# Patient Record
Sex: FEMALE | Race: BLACK OR AFRICAN AMERICAN | Employment: STUDENT | URBAN - METROPOLITAN AREA
[De-identification: names, ages, dates, MRNs, and addresses within clinical notes are randomized per-mention and may not be internally consistent; named-entity substitution may affect disease eponyms.]

---

## 2017-05-24 ENCOUNTER — OFFICE VISIT (OUTPATIENT)
Dept: FAMILY MEDICINE CLINIC | Facility: CLINIC | Age: 22
End: 2017-05-24

## 2017-05-24 ENCOUNTER — TELEPHONE (OUTPATIENT)
Dept: OBGYN CLINIC | Facility: CLINIC | Age: 22
End: 2017-05-24

## 2017-05-24 VITALS
SYSTOLIC BLOOD PRESSURE: 129 MMHG | DIASTOLIC BLOOD PRESSURE: 83 MMHG | RESPIRATION RATE: 16 BRPM | TEMPERATURE: 99 F | BODY MASS INDEX: 26 KG/M2 | HEART RATE: 101 BPM | WEIGHT: 153 LBS

## 2017-05-24 DIAGNOSIS — Z3A.30 30 WEEKS GESTATION OF PREGNANCY: Primary | ICD-10-CM

## 2017-05-24 PROCEDURE — 99212 OFFICE O/P EST SF 10 MIN: CPT | Performed by: FAMILY MEDICINE

## 2017-05-24 PROCEDURE — 99213 OFFICE O/P EST LOW 20 MIN: CPT | Performed by: FAMILY MEDICINE

## 2017-05-24 NOTE — TELEPHONE ENCOUNTER
PN Records from Dr. Gonzalez  office received via fax and placed on [facility] to start MD review process. Pt is 31w today.  THOMAS stated that she received a call from Dr. Schuyler Nicholson and Dr. Schuyler Nicholson would like to be notified if pt is accepted or denied into our prac

## 2017-05-24 NOTE — PROGRESS NOTES
HPI: Ms. Ti Hoff is a 22year old  female who is 32 0/7 weeks pregnant and presents for referral to OB/GYN. Was being seen by ARTUR SAAB CNM in [facility] for all her prenatal care up to this point. Last appt was 17.  She is up to date abdominal pain, constipation, decreased appetite, diarrhea and vomiting  Genitourinary:  Negative for dysuria and hematuria  Hema/Lymph:  Negative for easy bleeding and easy bruising  Integumentary:  Negative for pruritus and rash  Musculoskeletal:  Salvadore Stands

## 2017-05-25 ENCOUNTER — TELEPHONE (OUTPATIENT)
Dept: FAMILY MEDICINE CLINIC | Facility: CLINIC | Age: 22
End: 2017-05-25

## 2017-05-25 NOTE — TELEPHONE ENCOUNTER
Pt informed she has been accepted into our practice. Pt informed we have 6 docs (2 male, 4 female) and pt will see all of them throughout care. Pt verbalized understanding. Pt accepted OBN appt tomorrow at 10am. Records in old triage room.

## 2017-05-25 NOTE — TELEPHONE ENCOUNTER
LMTCB. MDs have reviewed pts PN records and pt has been accepted into our practice. Called Dr. Fabrice Rich office and informed one of her RNs that pt has been accepted. Pt needs to be scheduled for OBN appt.  Per Bashir Patel, pt can be added tomorrow (5/26) at 10am

## 2017-05-26 ENCOUNTER — NURSE ONLY (OUTPATIENT)
Dept: OBGYN CLINIC | Facility: CLINIC | Age: 22
End: 2017-05-26

## 2017-05-26 ENCOUNTER — APPOINTMENT (OUTPATIENT)
Dept: LAB | Facility: HOSPITAL | Age: 22
End: 2017-05-26
Attending: OBSTETRICS & GYNECOLOGY
Payer: MEDICAID

## 2017-05-26 VITALS — HEIGHT: 64 IN | WEIGHT: 154 LBS | BODY MASS INDEX: 26.29 KG/M2

## 2017-05-26 DIAGNOSIS — Z34.03 ENCOUNTER FOR SUPERVISION OF NORMAL FIRST PREGNANCY IN THIRD TRIMESTER: ICD-10-CM

## 2017-05-26 DIAGNOSIS — Z34.03 ENCOUNTER FOR SUPERVISION OF NORMAL FIRST PREGNANCY IN THIRD TRIMESTER: Primary | ICD-10-CM

## 2017-05-26 PROCEDURE — 36415 COLL VENOUS BLD VENIPUNCTURE: CPT

## 2017-05-26 PROCEDURE — 99211 OFF/OP EST MAY X REQ PHY/QHP: CPT | Performed by: OBSTETRICS & GYNECOLOGY

## 2017-05-26 PROCEDURE — 86787 VARICELLA-ZOSTER ANTIBODY: CPT

## 2017-05-26 NOTE — PROGRESS NOTES
Pt seen for OBN appt today with no complaints. Pt is a transfer of care from a clinic is [place], [place]. All PN labs were completed at previous clinic with the exception of Varicella. Pt denies ever having chicken pox and unsure if vaccinated.  Varicella or Thalassemia No    Other inherited genetic or chromosomal disorders No    Patient or baby's father had a child with birth defects not listed above No    Previous miscarriages or stillborn No

## 2017-05-30 ENCOUNTER — APPOINTMENT (OUTPATIENT)
Dept: PERINATAL CARE | Facility: HOSPITAL | Age: 22
DRG: 781 | End: 2017-05-30
Attending: OBSTETRICS & GYNECOLOGY
Payer: MEDICAID

## 2017-05-30 ENCOUNTER — INITIAL PRENATAL (OUTPATIENT)
Dept: OBGYN CLINIC | Facility: CLINIC | Age: 22
End: 2017-05-30

## 2017-05-30 ENCOUNTER — HOSPITAL ENCOUNTER (INPATIENT)
Facility: HOSPITAL | Age: 22
LOS: 2 days | Discharge: HOME | DRG: 781 | End: 2017-06-01
Attending: OBSTETRICS & GYNECOLOGY | Admitting: OBSTETRICS & GYNECOLOGY
Payer: MEDICAID

## 2017-05-30 VITALS
WEIGHT: 155 LBS | HEART RATE: 78 BPM | SYSTOLIC BLOOD PRESSURE: 161 MMHG | BODY MASS INDEX: 27 KG/M2 | DIASTOLIC BLOOD PRESSURE: 94 MMHG

## 2017-05-30 DIAGNOSIS — O13.3 PREGNANCY INDUCED HYPERTENSION, THIRD TRIMESTER: ICD-10-CM

## 2017-05-30 DIAGNOSIS — O36.5930 SGA (SMALL FOR GESTATIONAL AGE), FETAL, AFFECTING CARE OF MOTHER, ANTEPARTUM, THIRD TRIMESTER, NOT APPLICABLE OR UNSPECIFIED FETUS: Primary | ICD-10-CM

## 2017-05-30 DIAGNOSIS — O36.5930 SMALL FOR DATES AFFECTING MANAGEMENT OF MOTHER, THIRD TRIMESTER, NOT APPLICABLE OR UNSPECIFIED FETUS: ICD-10-CM

## 2017-05-30 DIAGNOSIS — R31.9 HEMATURIA: ICD-10-CM

## 2017-05-30 DIAGNOSIS — O13.3 PIH (PREGNANCY INDUCED HYPERTENSION), THIRD TRIMESTER: ICD-10-CM

## 2017-05-30 DIAGNOSIS — Z34.03 ENCOUNTER FOR SUPERVISION OF NORMAL FIRST PREGNANCY IN THIRD TRIMESTER: Primary | ICD-10-CM

## 2017-05-30 PROBLEM — O16.3 ELEVATED BLOOD PRESSURE AFFECTING PREGNANCY IN THIRD TRIMESTER, ANTEPARTUM: Status: ACTIVE | Noted: 2017-05-30

## 2017-05-30 PROBLEM — O13.9 PREGNANCY INDUCED HYPERTENSION: Status: ACTIVE | Noted: 2017-05-30

## 2017-05-30 PROBLEM — O36.5990 SGA (SMALL FOR GESTATIONAL AGE), FETAL, AFFECTING CARE OF MOTHER, ANTEPARTUM: Status: ACTIVE | Noted: 2017-05-30

## 2017-05-30 PROCEDURE — 81002 URINALYSIS NONAUTO W/O SCOPE: CPT | Performed by: OBSTETRICS & GYNECOLOGY

## 2017-05-30 PROCEDURE — 76811 OB US DETAILED SNGL FETUS: CPT | Performed by: OBSTETRICS & GYNECOLOGY

## 2017-05-30 PROCEDURE — 0500F INITIAL PRENATAL CARE VISIT: CPT | Performed by: OBSTETRICS & GYNECOLOGY

## 2017-05-30 PROCEDURE — 99222 1ST HOSP IP/OBS MODERATE 55: CPT | Performed by: OBSTETRICS & GYNECOLOGY

## 2017-05-30 RX ORDER — SODIUM CHLORIDE, SODIUM LACTATE, POTASSIUM CHLORIDE, CALCIUM CHLORIDE 600; 310; 30; 20 MG/100ML; MG/100ML; MG/100ML; MG/100ML
INJECTION, SOLUTION INTRAVENOUS CONTINUOUS
Status: DISCONTINUED | OUTPATIENT
Start: 2017-05-30 | End: 2017-06-01

## 2017-05-30 RX ORDER — BETAMETHASONE SODIUM PHOSPHATE AND BETAMETHASONE ACETATE 3; 3 MG/ML; MG/ML
12 INJECTION, SUSPENSION INTRA-ARTICULAR; INTRALESIONAL; INTRAMUSCULAR; SOFT TISSUE EVERY 24 HOURS
Status: COMPLETED | OUTPATIENT
Start: 2017-05-30 | End: 2017-05-31

## 2017-05-30 RX ORDER — LABETALOL HYDROCHLORIDE 5 MG/ML
INJECTION, SOLUTION INTRAVENOUS
Status: DISPENSED
Start: 2017-05-30 | End: 2017-05-31

## 2017-05-30 RX ORDER — LABETALOL HYDROCHLORIDE 5 MG/ML
20 INJECTION, SOLUTION INTRAVENOUS
Status: DISCONTINUED | OUTPATIENT
Start: 2017-05-30 | End: 2017-06-01

## 2017-05-30 RX ORDER — SODIUM CHLORIDE 0.9 % (FLUSH) 0.9 %
10 SYRINGE (ML) INJECTION AS NEEDED
Status: DISCONTINUED | OUTPATIENT
Start: 2017-05-30 | End: 2017-06-01

## 2017-05-30 RX ORDER — CALCIUM GLUCONATE 94 MG/ML
1 INJECTION, SOLUTION INTRAVENOUS ONCE AS NEEDED
Status: DISPENSED | OUTPATIENT
Start: 2017-05-30 | End: 2017-05-30

## 2017-05-30 RX ORDER — DEXTROSE, SODIUM CHLORIDE, SODIUM LACTATE, POTASSIUM CHLORIDE, AND CALCIUM CHLORIDE 5; .6; .31; .03; .02 G/100ML; G/100ML; G/100ML; G/100ML; G/100ML
INJECTION, SOLUTION INTRAVENOUS
Status: COMPLETED
Start: 2017-05-30 | End: 2017-05-30

## 2017-05-30 NOTE — CONSULTS
[address] [address] Patient Status:  Observation    1995 MRN [id number]   Location [address] C-D Attending David Badillo DO   Hosp Day # 0 PCP Kwesi Brown MD     SUBJECTIVE:  Reason for complete ultrasound report or in PACS    Data Review:     Lab Results  Component Value Date   WBC 9.6 05/30/2017   HGB 13.1 05/30/2017   HCT 39.5 05/30/2017    05/30/2017   CREATSERUM 0.81 05/30/2017   BUN 9 05/30/2017    05/30/2017   K 4.1 05

## 2017-05-30 NOTE — PROGRESS NOTES
New Transfer of care -- seen at [facility] for vaginal spotting last week & still w/ bleeding when urinates -- had high BP then & told to just f/u w/ PCP OB. Now w/ lower abd pain / back pain. No recent coitus.  SSE no blood -- FFN obtained to take to L&D  (+) H

## 2017-05-30 NOTE — H&P
[address] [facility] Patient Status:  Inpatient    1995 MRN [id number]   Location [address] [facility] [address] C-D Attending Luke Pascal DO   Hosp Day # 0 PCP Juarez Lobato MD     Date of Admission hospital encounter of 91/57/39  -COMP METABOLIC PANEL (14)   Result Value Ref Range   Glucose 81 70-99 mg/dL   Sodium 139 136-144 mmol/L   Potassium 4.1 3.3-5.1 mmol/L   Chloride 103  mmol/L   CO2 24 22-32 mmol/L   BUN 9 8-20 mg/dL   Creatinine 0.81 % 20 %   Monocyte % 11 %   Eosinophil % 3 %   Basophil % 1 %   Neutrophil Absolute 6.3 1.8-7.7 K/UL   Lymphocyte Absolute 1.9 1.0-4.0 K/UL   Monocyte Absolute 1.0 0.0-1.0 K/UL   Eosinophil Absolute 0.3 0.0-0.7 K/UL   Basophil Absolute 0.1 0.0-0.2 K/UL

## 2017-05-31 ENCOUNTER — APPOINTMENT (OUTPATIENT)
Dept: PERINATAL CARE | Facility: HOSPITAL | Age: 22
DRG: 781 | End: 2017-05-31
Attending: OBSTETRICS & GYNECOLOGY
Payer: MEDICAID

## 2017-05-31 PROCEDURE — 76819 FETAL BIOPHYS PROFIL W/O NST: CPT | Performed by: OBSTETRICS & GYNECOLOGY

## 2017-05-31 NOTE — PROGRESS NOTES
This note also relates to the following rows which could not be included:  Pulse - Cannot attach notes to unvalidated device data  BP - Cannot attach notes to unvalidated device data  SpO2 - Cannot attach notes to unvalidated device data  Pt c/o leaking of

## 2017-05-31 NOTE — CONSULTS
[facility]  Maternal Fetal Medicine Progress Note    Elizabeth Gee Patient Status:  Inpatient    1995 MRN [id number]   Location [address] C-D Attending Gloria Escobar, [facility] Day # 1 PCP Nhea Amador MD     [address] [facility] maternal risks of hypertension in pregnancy, and compared and contrasted gestational hypertension and preeclampsia.   We discussed that severe features (blood pressure and IUGR) can occur without significant proteinuria and is commonly called atypical preec

## 2017-05-31 NOTE — PROGRESS NOTES
This note also relates to the following rows which could not be included:  Pulse - Cannot attach notes to unvalidated device data  BP - Cannot attach notes to unvalidated device data  SpO2 - Cannot attach notes to unvalidated device data  DR Yeni Perez WAS

## 2017-05-31 NOTE — PROGRESS NOTES
[facility]    Antepartum Progress Note    Jean Claude Perez Patient Status:  Inpatient    1995 MRN [id number]   Location [address] C-D Attending David Badillo, [address] [facility] Day # 1 PCP Kwesi Brown MD     Date of Adm performed during the hospital encounter of 81/82/29  -COMP METABOLIC PANEL (14)   Result Value Ref Range   Glucose 81 70-99 mg/dL   Sodium 139 136-144 mmol/L   Potassium 4.1 3.3-5.1 mmol/L   Chloride 103  mmol/L   CO2 24 22-32 mmol/L   BUN 9 8-20 mg/ 70-99 mg/dL   Sodium 138 136-144 mmol/L   Potassium 4.1 3.3-5.1 mmol/L   Chloride 108  mmol/L   CO2 20 (L) 22-32 mmol/L   BUN 5 (L) 8-20 mg/dL   Creatinine 0.78 0.50-1.50 mg/dL   Calcium, Total 7.9 (L) 8.5-10.5 mg/dL   ALT 10 (L) 14-54 U/L   AST 19 1 elevated dopplers      Plan reviewed with Dr. Azra Carlisle ([facility] on call)   Fetus had 8/8 BPP with elevated dopplers today (dopplers slightly better, however still elevated)  Dr. Little Amen stopping mag and monitoring si/sx of preE  BP stable overnight on mag--rep

## 2017-06-01 VITALS
RESPIRATION RATE: 16 BRPM | WEIGHT: 155 LBS | OXYGEN SATURATION: 100 % | HEIGHT: 64 IN | TEMPERATURE: 98 F | BODY MASS INDEX: 26.46 KG/M2 | HEART RATE: 71 BPM | SYSTOLIC BLOOD PRESSURE: 130 MMHG | DIASTOLIC BLOOD PRESSURE: 82 MMHG

## 2017-06-01 PROCEDURE — 99232 SBSQ HOSP IP/OBS MODERATE 35: CPT | Performed by: OBSTETRICS & GYNECOLOGY

## 2017-06-01 PROCEDURE — 59025 FETAL NON-STRESS TEST: CPT | Performed by: OBSTETRICS & GYNECOLOGY

## 2017-06-01 NOTE — CONSULTS
[facility]  Maternal Fetal Medicine Progress Note    Darryn Prim Patient Status:  Inpatient    1995 MRN [id number]   Location [address] C-D Attending Zoya Mayorga, [address] [facility] Day # 2 PCP Shawna Askew MD     [address] [facility] or severe criteria of preeclampsia and that she needs to call her physician of she has severe HTN or symptoms (abdominal pain, HA, visual changes, N/V or concerns about fetal movment).     IMPRESSION:  ·  IUP at 33w4d  · Gestational hypertension   · Fetal g

## 2017-06-01 NOTE — PROGRESS NOTES
[facility]    OB/GYNE Antepartum note      Darryn Prim Patient Status:  Inpatient    1995 MRN [id number]   Location [address] C-D Attending Zoya Mayorga, [address] [facility] Day # 2 PCP MD Smita Torrez

## 2017-06-01 NOTE — PROGRESS NOTES
Discharge instructions gone over with patient and given to pt. Follow up for Bio-physical, ultrasound, NST's and doctor visit scheduled and reviewed with pt. Pt denies questions at this time.  Pt discharged home per wheelchair with S.O.

## 2017-06-06 ENCOUNTER — HOSPITAL ENCOUNTER (OUTPATIENT)
Dept: PERINATAL CARE | Facility: HOSPITAL | Age: 22
Discharge: HOME | End: 2017-06-06
Attending: OBSTETRICS & GYNECOLOGY
Payer: MEDICAID

## 2017-06-06 ENCOUNTER — HOSPITAL ENCOUNTER (INPATIENT)
Facility: HOSPITAL | Age: 22
LOS: 3 days | Discharge: HOME | End: 2017-06-09
Attending: OBSTETRICS & GYNECOLOGY | Admitting: OBSTETRICS & GYNECOLOGY
Payer: MEDICAID

## 2017-06-06 ENCOUNTER — APPOINTMENT (OUTPATIENT)
Dept: OBGYN CLINIC | Facility: HOSPITAL | Age: 22
End: 2017-06-06
Payer: MEDICAID

## 2017-06-06 ENCOUNTER — ROUTINE PRENATAL (OUTPATIENT)
Dept: OBGYN CLINIC | Facility: CLINIC | Age: 22
End: 2017-06-06

## 2017-06-06 VITALS
SYSTOLIC BLOOD PRESSURE: 134 MMHG | BODY MASS INDEX: 26.38 KG/M2 | DIASTOLIC BLOOD PRESSURE: 97 MMHG | HEART RATE: 97 BPM | WEIGHT: 154.5 LBS | RESPIRATION RATE: 16 BRPM | HEIGHT: 64 IN

## 2017-06-06 VITALS
HEART RATE: 114 BPM | DIASTOLIC BLOOD PRESSURE: 92 MMHG | SYSTOLIC BLOOD PRESSURE: 137 MMHG | BODY MASS INDEX: 26 KG/M2 | WEIGHT: 153 LBS

## 2017-06-06 DIAGNOSIS — O36.5930 SGA (SMALL FOR GESTATIONAL AGE), FETAL, AFFECTING CARE OF MOTHER, ANTEPARTUM, THIRD TRIMESTER, NOT APPLICABLE OR UNSPECIFIED FETUS: ICD-10-CM

## 2017-06-06 DIAGNOSIS — O36.5930 IUGR (INTRAUTERINE GROWTH RESTRICTION) AFFECTING CARE OF MOTHER, THIRD TRIMESTER, NOT APPLICABLE OR UNSPECIFIED FETUS: ICD-10-CM

## 2017-06-06 DIAGNOSIS — O13.3 GESTATIONAL HYPERTENSION W/O SIGNIFICANT PROTEINURIA IN 3RD TRIMESTER: ICD-10-CM

## 2017-06-06 DIAGNOSIS — Z34.93 ENCOUNTER FOR SUPERVISION OF NORMAL PREGNANCY IN THIRD TRIMESTER, UNSPECIFIED GRAVIDITY: Primary | ICD-10-CM

## 2017-06-06 DIAGNOSIS — O16.3 ELEVATED BLOOD PRESSURE AFFECTING PREGNANCY IN THIRD TRIMESTER, ANTEPARTUM: ICD-10-CM

## 2017-06-06 DIAGNOSIS — O16.3 ELEVATED BLOOD PRESSURE AFFECTING PREGNANCY IN THIRD TRIMESTER, ANTEPARTUM: Primary | ICD-10-CM

## 2017-06-06 PROBLEM — O36.5990 IUGR (INTRAUTERINE GROWTH RESTRICTION) AFFECTING CARE OF MOTHER: Status: ACTIVE | Noted: 2017-06-06

## 2017-06-06 PROCEDURE — 76819 FETAL BIOPHYS PROFIL W/O NST: CPT | Performed by: OBSTETRICS & GYNECOLOGY

## 2017-06-06 PROCEDURE — 99215 OFFICE O/P EST HI 40 MIN: CPT | Performed by: OBSTETRICS & GYNECOLOGY

## 2017-06-06 PROCEDURE — 76820 UMBILICAL ARTERY ECHO: CPT | Performed by: OBSTETRICS & GYNECOLOGY

## 2017-06-06 PROCEDURE — 76821 MIDDLE CEREBRAL ARTERY ECHO: CPT | Performed by: OBSTETRICS & GYNECOLOGY

## 2017-06-06 PROCEDURE — 76815 OB US LIMITED FETUS(S): CPT | Performed by: OBSTETRICS & GYNECOLOGY

## 2017-06-06 PROCEDURE — 0502F SUBSEQUENT PRENATAL CARE: CPT | Performed by: OBSTETRICS & GYNECOLOGY

## 2017-06-06 RX ORDER — AMMONIA INHALANTS 0.04 G/.3ML
0.3 INHALANT RESPIRATORY (INHALATION) AS NEEDED
Status: DISCONTINUED | OUTPATIENT
Start: 2017-06-06 | End: 2017-06-07

## 2017-06-06 RX ORDER — DEXTROSE, SODIUM CHLORIDE, SODIUM LACTATE, POTASSIUM CHLORIDE, AND CALCIUM CHLORIDE 5; .6; .31; .03; .02 G/100ML; G/100ML; G/100ML; G/100ML; G/100ML
125 INJECTION, SOLUTION INTRAVENOUS CONTINUOUS
Status: DISCONTINUED | OUTPATIENT
Start: 2017-06-06 | End: 2017-06-07

## 2017-06-06 RX ORDER — CALCIUM GLUCONATE 94 MG/ML
1 INJECTION, SOLUTION INTRAVENOUS ONCE AS NEEDED
Status: ACTIVE | OUTPATIENT
Start: 2017-06-06 | End: 2017-06-06

## 2017-06-06 RX ORDER — LABETALOL HYDROCHLORIDE 5 MG/ML
20 INJECTION, SOLUTION INTRAVENOUS
Status: DISCONTINUED | OUTPATIENT
Start: 2017-06-06 | End: 2017-06-09

## 2017-06-06 RX ORDER — LABETALOL HYDROCHLORIDE 5 MG/ML
INJECTION, SOLUTION INTRAVENOUS
Status: DISPENSED
Start: 2017-06-06 | End: 2017-06-07

## 2017-06-06 RX ORDER — SODIUM CHLORIDE, SODIUM LACTATE, POTASSIUM CHLORIDE, CALCIUM CHLORIDE 600; 310; 30; 20 MG/100ML; MG/100ML; MG/100ML; MG/100ML
INJECTION, SOLUTION INTRAVENOUS ONCE
Status: COMPLETED | OUTPATIENT
Start: 2017-06-06 | End: 2017-06-07

## 2017-06-06 RX ORDER — HYDRALAZINE HYDROCHLORIDE 20 MG/ML
INJECTION INTRAMUSCULAR; INTRAVENOUS
Status: DISCONTINUED | OUTPATIENT
Start: 2017-06-06 | End: 2017-06-09

## 2017-06-06 RX ORDER — SODIUM CHLORIDE 0.9 % (FLUSH) 0.9 %
10 SYRINGE (ML) INJECTION AS NEEDED
Status: DISCONTINUED | OUTPATIENT
Start: 2017-06-06 | End: 2017-06-07

## 2017-06-06 RX ORDER — IBUPROFEN 600 MG/1
600 TABLET ORAL ONCE AS NEEDED
Status: DISCONTINUED | OUTPATIENT
Start: 2017-06-06 | End: 2017-06-07

## 2017-06-06 RX ORDER — TERBUTALINE SULFATE 1 MG/ML
0.25 INJECTION, SOLUTION SUBCUTANEOUS AS NEEDED
Status: DISCONTINUED | OUTPATIENT
Start: 2017-06-06 | End: 2017-06-07

## 2017-06-06 RX ORDER — LIDOCAINE HYDROCHLORIDE 10 MG/ML
30 INJECTION, SOLUTION EPIDURAL; INFILTRATION; INTRACAUDAL; PERINEURAL ONCE
Status: DISCONTINUED | OUTPATIENT
Start: 2017-06-06 | End: 2017-06-07

## 2017-06-06 NOTE — PROGRESS NOTES
MFM follow up outpatient consultation -   IUGR; gestational hypertension    S: Good FM, no swelling, no visual changes, no HA, no N/V    Her history was reviewed form her visit on 5/30/17 and there were no interval changes.     O:  /97 mmHg  Pulse 97 me if additional questions or concerns arise. Total patient time was  40 minutes in evaluation, consultation, and coordination of care. Greater than 50% of this time was spent in face to face discussion with the patient.    I discussed the patient with

## 2017-06-06 NOTE — ADDENDUM NOTE
Encounter addended by: Marisa Mccarthy on: 6/6/2017  3:15 PM<BR>     Documentation filed: Charges VN

## 2017-06-06 NOTE — PROGRESS NOTES
Pt resting quietly with no complaints of pain or PIH symptoms, leung to gravity, Magnesium sulfate at 2gms/hr, D5LR infusing on pump at 75 cc/HR for total fluid volume of 125cc.hr.  Pt denies any contractions at this time

## 2017-06-06 NOTE — PROGRESS NOTES
No issues reported. To DMG for NST and BPP/Dopplers for IUGR, PIH. BP log reviewed and 100-140s/70-90s. No PreE symptoms reported. RTC 1 wk.

## 2017-06-06 NOTE — PROGRESS NOTES
Received pt from [facility] triage. Pt is to be admitted for IUGR, and IOL to start tonight at 1800. Pt oriented to room surroundings, plan of care discussed with patient and family. Pt denies any visual disturbances, headaches or epigastric pain.  Pt denies any u

## 2017-06-07 ENCOUNTER — ANESTHESIA EVENT (OUTPATIENT)
Dept: OBGYN UNIT | Facility: HOSPITAL | Age: 22
End: 2017-06-07
Payer: MEDICAID

## 2017-06-07 ENCOUNTER — ANESTHESIA (OUTPATIENT)
Dept: OBGYN UNIT | Facility: HOSPITAL | Age: 22
End: 2017-06-07
Payer: MEDICAID

## 2017-06-07 PROCEDURE — 99232 SBSQ HOSP IP/OBS MODERATE 35: CPT | Performed by: OBSTETRICS & GYNECOLOGY

## 2017-06-07 PROCEDURE — 3E033VJ INTRODUCTION OF OTHER HORMONE INTO PERIPHERAL VEIN, PERCUTANEOUS APPROACH: ICD-10-PCS | Performed by: OBSTETRICS & GYNECOLOGY

## 2017-06-07 PROCEDURE — 0T9B70Z DRAINAGE OF BLADDER WITH DRAINAGE DEVICE, VIA NATURAL OR ARTIFICIAL OPENING: ICD-10-PCS | Performed by: OBSTETRICS & GYNECOLOGY

## 2017-06-07 PROCEDURE — 3E0E7GC INTRODUCTION OF OTHER THERAPEUTIC SUBSTANCE INTO PRODUCTS OF CONCEPTION, VIA NATURAL OR ARTIFICIAL OPENING: ICD-10-PCS | Performed by: OBSTETRICS & GYNECOLOGY

## 2017-06-07 PROCEDURE — 10H07YZ INSERTION OF OTHER DEVICE INTO PRODUCTS OF CONCEPTION, VIA NATURAL OR ARTIFICIAL OPENING: ICD-10-PCS | Performed by: OBSTETRICS & GYNECOLOGY

## 2017-06-07 RX ORDER — LIDOCAINE HYDROCHLORIDE 10 MG/ML
INJECTION, SOLUTION EPIDURAL; INFILTRATION; INTRACAUDAL; PERINEURAL AS NEEDED
Status: DISCONTINUED | OUTPATIENT
Start: 2017-06-07 | End: 2017-06-07 | Stop reason: SURG

## 2017-06-07 RX ORDER — HYDROCODONE BITARTRATE AND ACETAMINOPHEN 5; 325 MG/1; MG/1
1 TABLET ORAL EVERY 4 HOURS PRN
Status: DISCONTINUED | OUTPATIENT
Start: 2017-06-07 | End: 2017-06-09

## 2017-06-07 RX ORDER — DIAPER,BRIEF,INFANT-TODD,DISP
1 EACH MISCELLANEOUS EVERY 6 HOURS PRN
Status: DISCONTINUED | OUTPATIENT
Start: 2017-06-07 | End: 2017-06-09

## 2017-06-07 RX ORDER — DEXTROSE, SODIUM CHLORIDE, SODIUM LACTATE, POTASSIUM CHLORIDE, AND CALCIUM CHLORIDE 5; .6; .31; .03; .02 G/100ML; G/100ML; G/100ML; G/100ML; G/100ML
INJECTION, SOLUTION INTRAVENOUS
Status: COMPLETED
Start: 2017-06-07 | End: 2017-06-07

## 2017-06-07 RX ORDER — AMMONIA INHALANTS 0.04 G/.3ML
0.3 INHALANT RESPIRATORY (INHALATION) AS NEEDED
Status: DISCONTINUED | OUTPATIENT
Start: 2017-06-07 | End: 2017-06-09

## 2017-06-07 RX ORDER — SODIUM CHLORIDE, SODIUM LACTATE, POTASSIUM CHLORIDE, CALCIUM CHLORIDE 600; 310; 30; 20 MG/100ML; MG/100ML; MG/100ML; MG/100ML
INJECTION, SOLUTION INTRAVENOUS
Status: DISPENSED
Start: 2017-06-07 | End: 2017-06-08

## 2017-06-07 RX ORDER — ONDANSETRON 2 MG/ML
4 INJECTION INTRAMUSCULAR; INTRAVENOUS EVERY 6 HOURS PRN
Status: DISCONTINUED | OUTPATIENT
Start: 2017-06-07 | End: 2017-06-09

## 2017-06-07 RX ORDER — NALBUPHINE HCL 10 MG/ML
2.5 AMPUL (ML) INJECTION
Status: DISCONTINUED | OUTPATIENT
Start: 2017-06-07 | End: 2017-06-07

## 2017-06-07 RX ORDER — EPHEDRINE SULFATE/0.9% NACL/PF 25 MG/5 ML
SYRINGE (ML) INTRAVENOUS
Status: DISCONTINUED
Start: 2017-06-07 | End: 2017-06-07 | Stop reason: WASHOUT

## 2017-06-07 RX ORDER — BISACODYL 10 MG
10 SUPPOSITORY, RECTAL RECTAL ONCE AS NEEDED
Status: DISCONTINUED | OUTPATIENT
Start: 2017-06-07 | End: 2017-06-09

## 2017-06-07 RX ORDER — ACETAMINOPHEN 325 MG/1
650 TABLET ORAL EVERY 4 HOURS PRN
Status: DISCONTINUED | OUTPATIENT
Start: 2017-06-07 | End: 2017-06-09

## 2017-06-07 RX ORDER — LIDOCAINE HYDROCHLORIDE AND EPINEPHRINE 15; 5 MG/ML; UG/ML
INJECTION, SOLUTION EPIDURAL AS NEEDED
Status: DISCONTINUED | OUTPATIENT
Start: 2017-06-07 | End: 2017-06-07 | Stop reason: SURG

## 2017-06-07 RX ORDER — SODIUM CHLORIDE 0.9 % (FLUSH) 0.9 %
10 SYRINGE (ML) INJECTION AS NEEDED
Status: DISCONTINUED | OUTPATIENT
Start: 2017-06-07 | End: 2017-06-09

## 2017-06-07 RX ORDER — EPHEDRINE SULFATE/0.9% NACL/PF 25 MG/5 ML
5 SYRINGE (ML) INTRAVENOUS AS NEEDED
Status: DISCONTINUED | OUTPATIENT
Start: 2017-06-07 | End: 2017-06-07

## 2017-06-07 RX ORDER — DOCUSATE SODIUM 100 MG/1
100 CAPSULE, LIQUID FILLED ORAL 2 TIMES DAILY
Status: DISCONTINUED | OUTPATIENT
Start: 2017-06-07 | End: 2017-06-09

## 2017-06-07 RX ORDER — BUPIVACAINE HYDROCHLORIDE 2.5 MG/ML
INJECTION, SOLUTION EPIDURAL; INFILTRATION; INTRACAUDAL AS NEEDED
Status: DISCONTINUED | OUTPATIENT
Start: 2017-06-07 | End: 2017-06-07 | Stop reason: SURG

## 2017-06-07 RX ORDER — DEXTROSE, SODIUM CHLORIDE, SODIUM LACTATE, POTASSIUM CHLORIDE, AND CALCIUM CHLORIDE 5; .6; .31; .03; .02 G/100ML; G/100ML; G/100ML; G/100ML; G/100ML
INJECTION, SOLUTION INTRAVENOUS CONTINUOUS
Status: DISCONTINUED | OUTPATIENT
Start: 2017-06-07 | End: 2017-06-09

## 2017-06-07 RX ORDER — HYDROCODONE BITARTRATE AND ACETAMINOPHEN 5; 325 MG/1; MG/1
2 TABLET ORAL EVERY 4 HOURS PRN
Status: DISCONTINUED | OUTPATIENT
Start: 2017-06-07 | End: 2017-06-09

## 2017-06-07 RX ORDER — BUPIVACAINE HYDROCHLORIDE 2.5 MG/ML
INJECTION, SOLUTION EPIDURAL; INFILTRATION; INTRACAUDAL
Status: DISPENSED
Start: 2017-06-07 | End: 2017-06-08

## 2017-06-07 RX ORDER — ZOLPIDEM TARTRATE 5 MG/1
5 TABLET ORAL NIGHTLY PRN
Status: DISCONTINUED | OUTPATIENT
Start: 2017-06-07 | End: 2017-06-09

## 2017-06-07 RX ORDER — PRENATAL VIT,CAL 76/IRON/FOLIC 29 MG-1 MG
1 TABLET ORAL DAILY
Status: DISCONTINUED | OUTPATIENT
Start: 2017-06-07 | End: 2017-06-09

## 2017-06-07 RX ORDER — SIMETHICONE 80 MG
80 TABLET,CHEWABLE ORAL 3 TIMES DAILY PRN
Status: DISCONTINUED | OUTPATIENT
Start: 2017-06-07 | End: 2017-06-09

## 2017-06-07 RX ADMIN — BUPIVACAINE HYDROCHLORIDE 10 MG: 2.5 INJECTION, SOLUTION EPIDURAL; INFILTRATION; INTRACAUDAL at 13:54:00

## 2017-06-07 RX ADMIN — LIDOCAINE HYDROCHLORIDE 1 ML: 10 INJECTION, SOLUTION EPIDURAL; INFILTRATION; INTRACAUDAL; PERINEURAL at 13:40:00

## 2017-06-07 RX ADMIN — LIDOCAINE HYDROCHLORIDE AND EPINEPHRINE 5 ML: 15; 5 INJECTION, SOLUTION EPIDURAL at 13:40:00

## 2017-06-07 RX ADMIN — LIDOCAINE HYDROCHLORIDE AND EPINEPHRINE 5 ML: 15; 5 INJECTION, SOLUTION EPIDURAL at 13:50:00

## 2017-06-07 NOTE — PLAN OF CARE
ANXIETY    • Will report anxiety at manageable levels Progressing        BIRTH - VAGINAL/ SECTION    • Fetal and maternal status remain reassuring during the birth process Progressing        HEENT status not within defined limits    • Pt will achie

## 2017-06-07 NOTE — ANESTHESIA PREPROCEDURE EVALUATION
Anesthesia PreOp Note    HPI:     Gely Grande is a 22year old female who presents for preoperative consultation requested by: * No surgeons listed *    Date of Surgery: * No dates entered *    * No procedures listed *  Indication: * No pre-op diagnos dextrose 5 % /lactated ringers infusion 125 mL/hr Intravenous Continuous Alex Brice MD Last Rate: 125 mL/hr at 06/07/17 0702 125 mL/hr at 06/07/17 0176   Lidocaine HCl (PF) (XYLOCAINE) 1 % injection SOLN 30 mL 30 mL Intradermal Once Alex Brice History Narrative       Available pre-op labs reviewed.     Lab Results  Component Value Date   WBC 13.6* 06/06/2017   RBC 4.49 06/06/2017   HGB 13.3 06/06/2017   HCT 40.3 06/06/2017   MCV 89.6 06/06/2017   MCH 29.6 06/06/2017   MCHC 33.0 06/06/2017   RDW 1

## 2017-06-07 NOTE — ANESTHESIA PROCEDURE NOTES
Labor Analgesia  Performed by: Rj Nelson  Authorized by: Rj Nelson    Patient Location:  OB  Start Time:  6/7/2017 1:39 PM  End Time:  6/7/2017 1:58 PM  Site Identification: surface landmarks    Reason for Block: labor epidural    Anesthesiologist:

## 2017-06-07 NOTE — PROGRESS NOTES
[facility]    Labor Progress Note    Josiah Cheek Patient Status:  Inpatient    1995 MRN [id number]   Location [address] C-D Attending Horacio Jones MD   Hosp Day # 1 PCP Anabel Marrufo MD       Subjective   Inter SPECGRAVITY 1.010 06/06/2017   GLUUR neg 06/06/2017   BILUR Negative 05/30/2017   KETUR Negative 05/30/2017   BLOODURINE Trace* 05/30/2017   PHURINE 5 06/06/2017   PROUR Negative 05/30/2017   UROBILINOGEN 2.0* 05/30/2017   NITRITE neg 06/06/2017   LEUUR

## 2017-06-07 NOTE — PROGRESS NOTES
6/7/2017, 2:50 PM    Subjective:  Patient is now comfortable with an epidural and on right side with O2 on when walking into the room. In room at about 1315 and placed IUPC with early deceleration. IUPC placed and amnioinfusion started.  Then epidural p

## 2017-06-07 NOTE — H&P
[address] Patient Status:  Inpatient    1995 MRN [id number]   Location [address] [facility] [address] C-D Attending Genevieve Hodgson MD   Hosp Day # 0 PCP Heriberto Dandy, MD     Date of Admission:  6 admission:  Prenatal Vit-Fe Fumarate-FA (GNP PRENATAL VITAMINS) 28-0.8 MG Oral Tab Take 1 tablet by mouth daily.  Disp:  Rfl:  6/6/2017 at Unknown time         Review of Systems:   As documented in HPI      Physical Exam:   Temp:  [98 °F (36.7 °C)-98.7 °F ( Doppler studies in IUGR fetus 5. Gestational hypertension  Thank you for allowing me to participate in the care of your patient. Please do not hesitate to contact me of questions or concerns arise.           Author: Lizett Perez MD Service: (none) Aut satisfaction.  She is agreeable to induction of labor.       IMPRESSION:  ·  IUP at 34w2d  · Gestational hypertension    · IUGR with abnormal Doppler studies    RECOMENDATIONS:   · I recommend moving forward with delivery due to the abnormal MCA/UA Doppler Joshua Montoya  6/6/2017  10:28 PM

## 2017-06-07 NOTE — DISCHARGE SUMMARY
[facility]    Discharge Summary    Mariano Funes Patient Status:  Inpatient    1995 MRN [id number]   Location [address] C-D Attending Liv Kerns MD   [facility] Day # 1       Delivering OB Clinician: Dr. Antony Doonvan Jane Wiley  6/7/2017  5:08 PM

## 2017-06-07 NOTE — L&D DELIVERY NOTE
[facility]OSP - [facility]    Vaginal Delivery Note    Patric Gilliland Patient Status:  Inpatient    1995 MRN [id number]   Location [address] C-D Attending Florence Birmingham MD   Hosp Day # 1 PCP Priyank Camara MD     Delivery     Juana Harrison

## 2017-06-07 NOTE — ANESTHESIA POSTPROCEDURE EVALUATION
Patient: Mariano Funes    Procedure Summary     Date Anesthesia Start Anesthesia Stop Room / Location    06/07/17 9945 0096        Procedure Diagnosis Scheduled Providers Responsible Provider    LABOR ANALGESIA No diagnosis on file.   Ariana Champagne MD

## 2017-06-08 NOTE — PLAN OF CARE
BIRTH - VAGINAL/ SECTION    • Fetal and maternal status remain reassuring during the birth process Completed        Patient/Family Goals    • Patient/Family Short Term Goal Completed          ANXIETY    • Will report anxiety at manageable levels Pr

## 2017-06-08 NOTE — PROGRESS NOTES
Post-Partum Note   6/8/2017, 10:40 AM    Subjective:  Patient feeling well this am and is without pain. She denies lightheadedness or dizziness. She is doing ok on Magnesium without visual changes. She is eating and tolerating regular diet.  She states her

## 2017-06-08 NOTE — PROGRESS NOTES
Pt tx to room 377 from [facility] via cart. Moved to PP bed. SCDs on bilat and functioning. Oriented to room.

## 2017-06-08 NOTE — LACTATION NOTE
LACTATION NOTE - MOTHER           Problems identified  Problems Identified Other: infant SCN    Maternal history  Other/comment: preeclamptic on magnesium    Breastfeeding goal  Breastfeeding goal: To maintain breast milk feeding per patient goal    [facility]

## 2017-06-09 ENCOUNTER — TELEPHONE (OUTPATIENT)
Dept: OBGYN CLINIC | Facility: CLINIC | Age: 22
End: 2017-06-09

## 2017-06-09 VITALS
WEIGHT: 153 LBS | HEART RATE: 97 BPM | OXYGEN SATURATION: 100 % | BODY MASS INDEX: 26.12 KG/M2 | HEIGHT: 64 IN | RESPIRATION RATE: 16 BRPM | DIASTOLIC BLOOD PRESSURE: 98 MMHG | TEMPERATURE: 98 F | SYSTOLIC BLOOD PRESSURE: 137 MMHG

## 2017-06-09 PROCEDURE — 99238 HOSP IP/OBS DSCHRG MGMT 30/<: CPT | Performed by: OBSTETRICS & GYNECOLOGY

## 2017-06-09 RX ORDER — SODIUM CHLORIDE, SODIUM LACTATE, POTASSIUM CHLORIDE, CALCIUM CHLORIDE 600; 310; 30; 20 MG/100ML; MG/100ML; MG/100ML; MG/100ML
INJECTION, SOLUTION INTRAVENOUS
Status: DISCONTINUED
Start: 2017-06-09 | End: 2017-06-09

## 2017-06-17 ENCOUNTER — NURSE ONLY (OUTPATIENT)
Dept: OBGYN CLINIC | Facility: CLINIC | Age: 22
End: 2017-06-17

## 2017-06-17 VITALS
HEART RATE: 123 BPM | BODY MASS INDEX: 23 KG/M2 | SYSTOLIC BLOOD PRESSURE: 135 MMHG | WEIGHT: 136.38 LBS | DIASTOLIC BLOOD PRESSURE: 93 MMHG

## 2017-06-17 DIAGNOSIS — Z01.30 BLOOD PRESSURE CHECK: Primary | ICD-10-CM

## 2017-06-17 PROCEDURE — 99211 OFF/OP EST MAY X REQ PHY/QHP: CPT | Performed by: OBSTETRICS & GYNECOLOGY

## 2017-06-17 NOTE — PROGRESS NOTES
Pt presents today for blood pressure check. Pt had vaginal delivery with KCB on 6/7/17 and pt stated she had elevated BPs in the third trimester. No meds, but pt stated she was on magnesium on L&D.  Pt denies any headaches, dizziness, blurry vision, or RUQ

## 2017-06-22 ENCOUNTER — NURSE ONLY (OUTPATIENT)
Dept: OBGYN CLINIC | Facility: CLINIC | Age: 22
End: 2017-06-22

## 2017-06-22 ENCOUNTER — HOSPITAL ENCOUNTER (EMERGENCY)
Facility: HOSPITAL | Age: 22
Discharge: HOME | End: 2017-06-22
Attending: EMERGENCY MEDICINE
Payer: MEDICAID

## 2017-06-22 VITALS
RESPIRATION RATE: 18 BRPM | WEIGHT: 135 LBS | HEIGHT: 64 IN | BODY MASS INDEX: 23.05 KG/M2 | DIASTOLIC BLOOD PRESSURE: 93 MMHG | SYSTOLIC BLOOD PRESSURE: 159 MMHG | TEMPERATURE: 98 F | HEART RATE: 105 BPM | OXYGEN SATURATION: 100 %

## 2017-06-22 VITALS
BODY MASS INDEX: 23 KG/M2 | HEART RATE: 120 BPM | DIASTOLIC BLOOD PRESSURE: 98 MMHG | WEIGHT: 135.38 LBS | SYSTOLIC BLOOD PRESSURE: 140 MMHG

## 2017-06-22 DIAGNOSIS — I10 ESSENTIAL HYPERTENSION: Primary | ICD-10-CM

## 2017-06-22 DIAGNOSIS — Z01.30 BLOOD PRESSURE CHECK: Primary | ICD-10-CM

## 2017-06-22 PROCEDURE — 80048 BASIC METABOLIC PNL TOTAL CA: CPT | Performed by: EMERGENCY MEDICINE

## 2017-06-22 PROCEDURE — 99211 OFF/OP EST MAY X REQ PHY/QHP: CPT | Performed by: OBSTETRICS & GYNECOLOGY

## 2017-06-22 PROCEDURE — 99283 EMERGENCY DEPT VISIT LOW MDM: CPT

## 2017-06-22 PROCEDURE — 80076 HEPATIC FUNCTION PANEL: CPT | Performed by: EMERGENCY MEDICINE

## 2017-06-22 PROCEDURE — 85025 COMPLETE CBC W/AUTO DIFF WBC: CPT | Performed by: EMERGENCY MEDICINE

## 2017-06-22 PROCEDURE — 81001 URINALYSIS AUTO W/SCOPE: CPT | Performed by: EMERGENCY MEDICINE

## 2017-06-22 PROCEDURE — 36415 COLL VENOUS BLD VENIPUNCTURE: CPT

## 2017-06-22 RX ORDER — LABETALOL 100 MG/1
100 TABLET, FILM COATED ORAL 2 TIMES DAILY
Qty: 14 TABLET | Refills: 0 | Status: SHIPPED | OUTPATIENT
Start: 2017-06-22 | End: 2017-06-29

## 2017-06-22 NOTE — ED NOTES
PT safe to DC home per MD. Dennys Barkley to dress self. DC teaching done, pt verbalizes understanding. Ambulatory with steady gait to exit.

## 2017-06-22 NOTE — PROGRESS NOTES
Pt here for BP check. Pt had  on 17. Pt had IOL d/t IUGR and HTN at 34 weeks. Pt was given MgSo4 in L&D and D/C home with no meds. Pt denies HA, blurry vision, and epigastric pain. Pt states she feels \"fine\".  Initial BP was taken, then 5 min late

## 2017-06-22 NOTE — ED PROVIDER NOTES
Patient Seen in: [facility] AND [facility] Emergency Department    History   Patient presents with:  Hypertension (cardiovascular)    Stated Complaint:     HPI    Patient is a 26-year-old female that had a normal spontaneous vaginal delivery 2 weeks ago.   She wa kg/m2  SpO2 100%  LMP 10/19/2016        Physical Exam    Constitutional: Oriented to person, place, and time. Appears well-developed and well-nourished. HEENT:   Head: Normocephalic and atraumatic.    Right Ear: External ear normal.   Left Ear: External e order CBC WITH DIFFERENTIAL WITH PLATELET.   Procedure                               Abnormality         Status                     ---------                               -----------         ------                     CBC W/ DIFFERENTIAL[[id number]]

## 2017-06-22 NOTE — ED INITIAL ASSESSMENT (HPI)
Pt sent from [facility] for evaluation for HTN. Pt had a baby 2 weeks ago and had HTN at the end of her pregnancy.  No cp/sob

## 2020-02-10 NOTE — TELEPHONE ENCOUNTER
[facility] EMERGENCY DEPT  eMERGENCYdEPARTMENT eNCOUnter      Pt Name: Karin Thompson  MRN: [id number]  [place] 1950  Date of evaluation: 2/10/2020  Provider:MARINA Do    CHIEF COMPLAINT       Chief Complaint   Patient presents with    Dizziness    Head Congestion    Shortness of Breath         HISTORY OF PRESENT ILLNESS  (Location/Symptom, Timing/Onset, Context/Setting, Quality, Duration, Modifying Factors, Severity.)   Karin Thompson is a 77 y.o. male who presents to the emergency department with complaints of shortness of breath and head congestion and dizziness described as a vertiginous. There is been ongoing for 3 days patient has COPD history type 2 diabetes he is on blood thinners has a stent done by Dr. Stephanie Nicole. Patient admits to a 3-day onset of symptoms he is ambulatory on room air baseline his wife states that he is mentally alert just slower. Patient has no prior history of stroke. Denies any chest pain or GI complaints. Looks like transfusion hx for B12. Appears patient was seen today with AMS and sent here. Per cardiology last diagnostic cath done on 11/3/2019. HPI    Nursing Notes were reviewed and I agree. REVIEW OF SYSTEMS    (2-9 systems for level 4, 10 or more for level 5)     Review of Systems   Constitutional: Negative for activity change, appetite change, chills and fever. HENT: Negative for congestion and dental problem. Eyes: Negative for photophobia, discharge and itching. Respiratory: Negative for apnea, cough and shortness of breath. Cardiovascular: Negative for chest pain. Musculoskeletal: Negative for arthralgias, back pain, gait problem, myalgias and neck pain. Skin: Negative for color change, pallor and rash. Neurological: Positive for dizziness. Negative for seizures and syncope. Vertigo positional   Psychiatric/Behavioral: Negative for agitation. The patient is not nervous/anxious.          Slowed mentation        Except as noted above the Pt. States that she had her baby on 6/7, and discharge papers instruct the pt. To schedule a RN visit for Blood Pressure check. HFA (VENTOLIN HFA) 108 (90 Base) MCG/ACT inhaler INHALE 2 PUFFS INTO THE LUNGS EVERY 6 HOURS AS NEEDED., Disp-18 g, R-3Normal      aspirin 81 MG chewable tablet Take 1 tablet by mouth daily, Disp-30 tablet, R-3Normal      nitroGLYCERIN (NITROSTAT) 0.4 MG SL tablet up to max of 3 total doses.  If no relief after 1 dose, call 911., Disp-25 tablet, R-3Normal      clopidogrel (PLAVIX) 75 MG tablet Take 1 tablet by mouth daily, Disp-30 tablet, R-3Normal      metFORMIN (GLUCOPHAGE) 1000 MG tablet TAKE ONE TABLET BY MOUTH TWO TIMES A DAY WITH MEALS, Disp-180 tablet, R-3Normal      naloxegol (MOVANTIK) 25 MG TABS tablet Take 1 tablet by mouth every morning, Disp-30 tablet, R-5Normal      Lancets MISC Disp-100 each, R-5, NormalDaily      EPINEPHrine (EPIPEN) 0.3 MG/0.3ML SOAJ injection Use as directed for allergic reaction, Disp-0.3 mL, R-3Normal      oxyCODONE-acetaminophen (PERCOCET)  MG per tablet Take 1 tablet by mouth every 8 hours as needed for Pain             ALLERGIES     Bee venom and Pcn [penicillins]    FAMILY HISTORY       Family History   Problem Relation Age of Onset    Cancer Mother     Heart Disease Father           SOCIAL HISTORY       Social History     Socioeconomic History    Marital status:      Spouse name: Not on file    Number of children: Not on file    Years of education: Not on file    Highest education level: Not on file   Occupational History    Not on file   Social Needs    Financial resource strain: Not on file    Food insecurity:     Worry: Not on file     Inability: Not on file    Transportation needs:     Medical: Not on file     Non-medical: Not on file   Tobacco Use    Smoking status: Former Smoker     Packs/day: 1.50     Years: 15.00     Pack years: 22.50     Types: Cigarettes     Last attempt to quit: 10/23/2000     Years since quittin.3    Smokeless tobacco: Never Used   Substance and Sexual Activity    Alcohol use: No    Drug use: No    Sexual Chest wall: No tenderness. Abdominal:      General: Bowel sounds are normal. There is no distension. Palpations: Abdomen is soft. Tenderness: There is no abdominal tenderness. Musculoskeletal: Normal range of motion. Skin:     General: Skin is warm and dry. Capillary Refill: Capillary refill takes less than 2 seconds. Neurological:      General: No focal deficit present. Mental Status: He is alert and oriented to person, place, and time. Psychiatric:         Mood and Affect: Mood normal.         Behavior: Behavior normal.         Thought Content: Thought content normal.         Judgment: Judgment normal.           DIAGNOSTIC RESULTS     RADIOLOGY:   Non-plain film images such as CT, Ultrasound and MRI are read by the radiologist. Plain radiographic images are visualized and preliminarilyinterpreted by No att. providers found with the below findings:      Interpretation per the Radiologist below, if available at the time of this note:    XR CHEST STANDARD (2 VW)   Final Result   1. No radiographic evidence of acute cardiopulmonary process. Signed by Dr Nicanor Velez on 2/10/2020 4:20 PM      CT Head WO Contrast   Final Result   1. No acute intracranial process.    Signed by Dr Nicanor Velez on 2/10/2020 3:49 PM          LABS:  Labs Reviewed   CBC WITH AUTO DIFFERENTIAL - Abnormal; Notable for the following components:       Result Value    RBC 4.36 (*)     Hemoglobin 13.9 (*)     Hematocrit 41.7 (*)     MCV 95.6 (*)     MCH 31.9 (*)     Platelets 76 (*)     Neutrophils % 76.6 (*)     Lymphocytes % 14.0 (*)     Lymphocytes Absolute 0.9 (*)     All other components within normal limits   COMPREHENSIVE METABOLIC PANEL - Abnormal; Notable for the following components:    Glucose 120 (*)     All other components within normal limits   URINE RT REFLEX TO CULTURE - Abnormal; Notable for the following components:    Leukocyte Esterase, Urine SMALL (*)     All other components within normal Dizziness or Nausea, Disp-15 tablet, R-0Print             (Please note that portions of this note were completed with a voice recognition program.  Efforts were made to edit the dictations but occasionallywords are mis-transcribed.)    Mara Leroy [address], [place]  02/11/20 1956

## 2020-06-25 ENCOUNTER — TELEPHONE (OUTPATIENT)
Dept: SCHEDULING | Age: 25
End: 2020-06-25

## 2020-06-26 ENCOUNTER — TELEPHONE (OUTPATIENT)
Dept: SCHEDULING | Age: 25
End: 2020-06-26

## 2020-06-26 ENCOUNTER — E-ADVICE (OUTPATIENT)
Dept: INTERNAL MEDICINE | Age: 25
End: 2020-06-26

## 2020-06-29 ENCOUNTER — LAB SERVICES (OUTPATIENT)
Dept: LAB | Age: 25
End: 2020-06-29

## 2020-06-29 ENCOUNTER — TELEPHONE (OUTPATIENT)
Dept: SCHEDULING | Age: 25
End: 2020-06-29

## 2020-06-29 ENCOUNTER — OFFICE VISIT (OUTPATIENT)
Dept: INTERNAL MEDICINE | Age: 25
End: 2020-06-29

## 2020-06-29 ENCOUNTER — TELEPHONE (OUTPATIENT)
Dept: INTERNAL MEDICINE | Age: 25
End: 2020-06-29

## 2020-06-29 VITALS
TEMPERATURE: 98.3 F | BODY MASS INDEX: 18.73 KG/M2 | SYSTOLIC BLOOD PRESSURE: 119 MMHG | WEIGHT: 112.43 LBS | RESPIRATION RATE: 16 BRPM | HEIGHT: 65 IN | HEART RATE: 159 BPM | DIASTOLIC BLOOD PRESSURE: 79 MMHG

## 2020-06-29 DIAGNOSIS — R00.0 TACHYCARDIA: ICD-10-CM

## 2020-06-29 DIAGNOSIS — R63.4 WEIGHT LOSS: ICD-10-CM

## 2020-06-29 DIAGNOSIS — R79.89 ELEVATED LFTS: ICD-10-CM

## 2020-06-29 DIAGNOSIS — F41.9 ANXIETY: ICD-10-CM

## 2020-06-29 DIAGNOSIS — R00.0 TACHYCARDIA: Primary | ICD-10-CM

## 2020-06-29 DIAGNOSIS — D64.9 ANEMIA, UNSPECIFIED TYPE: ICD-10-CM

## 2020-06-29 PROBLEM — R19.7 DIARRHEA OF PRESUMED INFECTIOUS ORIGIN: Status: RESOLVED | Noted: 2020-06-20 | Resolved: 2020-06-29

## 2020-06-29 PROBLEM — R19.7 DIARRHEA OF PRESUMED INFECTIOUS ORIGIN: Status: ACTIVE | Noted: 2020-06-20

## 2020-06-29 PROBLEM — R50.9 FEVER OF UNKNOWN ORIGIN: Status: ACTIVE | Noted: 2020-06-20

## 2020-06-29 PROBLEM — R50.9 FEVER OF UNKNOWN ORIGIN: Status: RESOLVED | Noted: 2020-06-20 | Resolved: 2020-06-29

## 2020-06-29 LAB
BASOPHILS # BLD: 0.3 K/MCL (ref 0–0.3)
BASOPHILS NFR BLD: 4 %
DIFFERENTIAL METHOD BLD: ABNORMAL
EOSINOPHIL # BLD: 0.1 K/MCL (ref 0.1–0.5)
EOSINOPHIL NFR BLD: 1 %
ERYTHROCYTE [DISTWIDTH] IN BLOOD: 13.9 % (ref 11–15)
HCT VFR BLD CALC: 38.7 % (ref 36–46.5)
HGB BLD-MCNC: 11.9 G/DL (ref 12–15.5)
IMM GRANULOCYTES # BLD AUTO: 0 K/MCL (ref 0–0.2)
IMM GRANULOCYTES NFR BLD: 1 %
LYMPHOCYTES # BLD: 2.7 K/MCL (ref 1–4.8)
LYMPHOCYTES NFR BLD: 42 %
MCH RBC QN AUTO: 29.1 PG (ref 26–34)
MCHC RBC AUTO-ENTMCNC: 30.7 G/DL (ref 32–36.5)
MCV RBC AUTO: 94.6 FL (ref 78–100)
MONOCYTES # BLD: 0.6 K/MCL (ref 0.3–0.9)
MONOCYTES NFR BLD: 9 %
NEUTROPHILS # BLD: 2.9 K/MCL (ref 1.8–7.7)
NEUTROPHILS NFR BLD: 43 %
NRBC BLD MANUAL-RTO: 0 /100 WBC
PLATELET # BLD: 941 K/MCL (ref 140–450)
RBC # BLD: 4.09 MIL/MCL (ref 4–5.2)
TSH SERPL-ACNC: 0.68 MCUNITS/ML (ref 0.35–5)
WBC # BLD: 6.6 K/MCL (ref 4.2–11)

## 2020-06-29 PROCEDURE — 36415 COLL VENOUS BLD VENIPUNCTURE: CPT

## 2020-06-29 PROCEDURE — 84481 FREE ASSAY (FT-3): CPT | Performed by: INTERNAL MEDICINE

## 2020-06-29 PROCEDURE — 85025 COMPLETE CBC W/AUTO DIFF WBC: CPT | Performed by: INTERNAL MEDICINE

## 2020-06-29 PROCEDURE — 84443 ASSAY THYROID STIM HORMONE: CPT | Performed by: INTERNAL MEDICINE

## 2020-06-29 PROCEDURE — 80053 COMPREHEN METABOLIC PANEL: CPT | Performed by: INTERNAL MEDICINE

## 2020-06-29 PROCEDURE — 84439 ASSAY OF FREE THYROXINE: CPT | Performed by: INTERNAL MEDICINE

## 2020-06-29 PROCEDURE — 99204 OFFICE O/P NEW MOD 45 MIN: CPT | Performed by: INTERNAL MEDICINE

## 2020-06-29 RX ORDER — PROPRANOLOL HYDROCHLORIDE 20 MG/1
20 TABLET ORAL 3 TIMES DAILY
Qty: 90 TABLET | Refills: 3 | Status: SHIPPED | OUTPATIENT
Start: 2020-06-29

## 2020-06-29 SDOH — HEALTH STABILITY: MENTAL HEALTH: HOW OFTEN DO YOU HAVE A DRINK CONTAINING ALCOHOL?: NEVER

## 2020-06-29 SDOH — HEALTH STABILITY: PHYSICAL HEALTH: ON AVERAGE, HOW MANY MINUTES DO YOU ENGAGE IN EXERCISE AT THIS LEVEL?: 0 MIN

## 2020-06-29 SDOH — HEALTH STABILITY: PHYSICAL HEALTH: ON AVERAGE, HOW MANY DAYS PER WEEK DO YOU ENGAGE IN MODERATE TO STRENUOUS EXERCISE (LIKE A BRISK WALK)?: 0 DAYS

## 2020-06-29 ASSESSMENT — PATIENT HEALTH QUESTIONNAIRE - PHQ9
SUM OF ALL RESPONSES TO PHQ9 QUESTIONS 1 AND 2: 0
CLINICAL INTERPRETATION OF PHQ9 SCORE: NO FURTHER SCREENING NEEDED
CLINICAL INTERPRETATION OF PHQ2 SCORE: NO FURTHER SCREENING NEEDED
2. FEELING DOWN, DEPRESSED OR HOPELESS: NOT AT ALL
1. LITTLE INTEREST OR PLEASURE IN DOING THINGS: NOT AT ALL
SUM OF ALL RESPONSES TO PHQ9 QUESTIONS 1 AND 2: 0

## 2020-06-29 ASSESSMENT — ENCOUNTER SYMPTOMS
RESPIRATORY NEGATIVE: 1
NERVOUS/ANXIOUS: 1
DIZZINESS: 0
UNEXPECTED WEIGHT CHANGE: 1
HEADACHES: 0
DIARRHEA: 1
EYES NEGATIVE: 1

## 2020-06-30 DIAGNOSIS — R63.4 WEIGHT LOSS: ICD-10-CM

## 2020-06-30 DIAGNOSIS — R00.0 TACHYCARDIA: ICD-10-CM

## 2020-06-30 DIAGNOSIS — R77.1 HYPERGLOBULINEMIA: Primary | ICD-10-CM

## 2020-06-30 LAB
ALBUMIN SERPL-MCNC: 3.9 G/DL (ref 3.6–5.1)
ALBUMIN/GLOB SERPL: 0.7 {RATIO} (ref 1–2.4)
ALP SERPL-CCNC: 115 UNITS/L (ref 45–117)
ALT SERPL-CCNC: 101 UNITS/L
ANION GAP SERPL CALC-SCNC: 17 MMOL/L (ref 10–20)
AST SERPL-CCNC: 40 UNITS/L
BILIRUB SERPL-MCNC: 0.5 MG/DL (ref 0.2–1)
BUN SERPL-MCNC: 8 MG/DL (ref 6–20)
BUN/CREAT SERPL: 12 (ref 7–25)
CALCIUM SERPL-MCNC: 10.6 MG/DL (ref 8.4–10.2)
CHLORIDE SERPL-SCNC: 102 MMOL/L (ref 98–107)
CO2 SERPL-SCNC: 21 MMOL/L (ref 21–32)
CREAT SERPL-MCNC: 0.68 MG/DL (ref 0.51–0.95)
GLOBULIN SER-MCNC: 5.7 G/DL (ref 2–4)
GLUCOSE SERPL-MCNC: 111 MG/DL (ref 65–99)
LENGTH OF FAST TIME PATIENT: ABNORMAL HRS
POTASSIUM SERPL-SCNC: 4.3 MMOL/L (ref 3.4–5.1)
PROT SERPL-MCNC: 9.6 G/DL (ref 6.4–8.2)
SODIUM SERPL-SCNC: 136 MMOL/L (ref 135–145)

## 2020-07-01 ENCOUNTER — E-ADVICE (OUTPATIENT)
Dept: INTERNAL MEDICINE | Age: 25
End: 2020-07-01

## 2020-07-01 DIAGNOSIS — R77.1 HYPERGLOBULINEMIA: Primary | ICD-10-CM

## 2020-07-01 DIAGNOSIS — R29.810 FACIAL MUSCLE WEAKNESS: ICD-10-CM

## 2020-07-01 LAB
T3FREE SERPL-MCNC: 3 PG/ML (ref 2.2–4)
T4 FREE SERPL-MCNC: 1.3 NG/DL (ref 0.8–1.5)

## 2020-07-02 ENCOUNTER — E-ADVICE (OUTPATIENT)
Dept: INTERNAL MEDICINE | Age: 25
End: 2020-07-02

## 2020-07-02 ENCOUNTER — TELEPHONE (OUTPATIENT)
Dept: SCHEDULING | Age: 25
End: 2020-07-02

## 2020-07-02 RX ORDER — PROPRANOLOL HYDROCHLORIDE 20 MG/1
20 TABLET ORAL 3 TIMES DAILY
Qty: 90 TABLET | Refills: 3 | OUTPATIENT
Start: 2020-07-02

## 2020-07-06 ENCOUNTER — TELEPHONE (OUTPATIENT)
Dept: INTERNAL MEDICINE | Age: 25
End: 2020-07-06

## 2020-07-07 ENCOUNTER — TELEPHONE (OUTPATIENT)
Dept: SCHEDULING | Age: 25
End: 2020-07-07

## 2020-07-07 ENCOUNTER — TELEPHONE (OUTPATIENT)
Dept: INTERNAL MEDICINE | Age: 25
End: 2020-07-07

## 2020-07-07 ENCOUNTER — V-VISIT (OUTPATIENT)
Dept: INTERNAL MEDICINE | Age: 25
End: 2020-07-07

## 2020-07-07 ENCOUNTER — LAB SERVICES (OUTPATIENT)
Dept: LAB | Age: 25
End: 2020-07-07

## 2020-07-07 DIAGNOSIS — R63.4 WEIGHT LOSS: ICD-10-CM

## 2020-07-07 DIAGNOSIS — R73.9 HYPERGLYCEMIA: ICD-10-CM

## 2020-07-07 DIAGNOSIS — R77.1 HYPERGLOBULINEMIA: ICD-10-CM

## 2020-07-07 DIAGNOSIS — E83.52 HYPERCALCEMIA: ICD-10-CM

## 2020-07-07 DIAGNOSIS — R79.89 ELEVATED LFTS: ICD-10-CM

## 2020-07-07 DIAGNOSIS — F41.9 ANXIETY: ICD-10-CM

## 2020-07-07 DIAGNOSIS — R00.0 TACHYCARDIA: ICD-10-CM

## 2020-07-07 DIAGNOSIS — E83.52 HYPERCALCEMIA: Primary | ICD-10-CM

## 2020-07-07 PROBLEM — D64.9 ANEMIA: Status: RESOLVED | Noted: 2020-06-20 | Resolved: 2020-07-07

## 2020-07-07 LAB — HBA1C MFR BLD: 5 % (ref 4.5–5.6)

## 2020-07-07 PROCEDURE — 83970 ASSAY OF PARATHORMONE: CPT | Performed by: INTERNAL MEDICINE

## 2020-07-07 PROCEDURE — 36415 COLL VENOUS BLD VENIPUNCTURE: CPT

## 2020-07-07 PROCEDURE — 84156 ASSAY OF PROTEIN URINE: CPT | Performed by: INTERNAL MEDICINE

## 2020-07-07 PROCEDURE — 84166 PROTEIN E-PHORESIS/URINE/CSF: CPT | Performed by: INTERNAL MEDICINE

## 2020-07-07 PROCEDURE — 84165 PROTEIN E-PHORESIS SERUM: CPT | Performed by: INTERNAL MEDICINE

## 2020-07-07 PROCEDURE — 84155 ASSAY OF PROTEIN SERUM: CPT | Performed by: INTERNAL MEDICINE

## 2020-07-07 PROCEDURE — 82330 ASSAY OF CALCIUM: CPT | Performed by: INTERNAL MEDICINE

## 2020-07-07 PROCEDURE — 99214 OFFICE O/P EST MOD 30 MIN: CPT | Performed by: INTERNAL MEDICINE

## 2020-07-07 PROCEDURE — 83036 HEMOGLOBIN GLYCOSYLATED A1C: CPT | Performed by: INTERNAL MEDICINE

## 2020-07-07 SDOH — HEALTH STABILITY: MENTAL HEALTH: HOW OFTEN DO YOU HAVE A DRINK CONTAINING ALCOHOL?: NEVER

## 2020-07-07 SDOH — HEALTH STABILITY: PHYSICAL HEALTH: ON AVERAGE, HOW MANY DAYS PER WEEK DO YOU ENGAGE IN MODERATE TO STRENUOUS EXERCISE (LIKE A BRISK WALK)?: 0 DAYS

## 2020-07-07 SDOH — HEALTH STABILITY: PHYSICAL HEALTH: ON AVERAGE, HOW MANY MINUTES DO YOU ENGAGE IN EXERCISE AT THIS LEVEL?: 0 MIN

## 2020-07-07 ASSESSMENT — ENCOUNTER SYMPTOMS
HEADACHES: 0
DIARRHEA: 0
DIZZINESS: 0
PSYCHIATRIC NEGATIVE: 1
FEVER: 0
UNEXPECTED WEIGHT CHANGE: 0
RESPIRATORY NEGATIVE: 1

## 2020-07-08 LAB
CA-I ADJ PH7.4 SERPL-SCNC: 1.27 MMOL/L (ref 1.15–1.29)
CA-I SERPL ISE-SCNC: 1.31 MMOL/L (ref 1.15–1.29)
PATH INTERP SPEC-IMP: ABNORMAL
PROT ?TM UR-MCNC: 14 MG/DL
PROT ELECT URINE RANDOM INTERPRETATION (UPERMXCPRPT): NORMAL

## 2020-07-09 LAB
PATH INTERP SPEC-IMP: NORMAL
PROT SERPL-MCNC: 8.1 G/DL (ref 6.4–8.2)
PROTEIN ELECTROPHORESIS INTERPRETATION (SPEMXCPRPT): NORMAL

## 2020-07-10 ENCOUNTER — LAB SERVICES (OUTPATIENT)
Dept: LAB | Age: 25
End: 2020-07-10

## 2020-07-10 ENCOUNTER — OFFICE VISIT (OUTPATIENT)
Dept: NEUROLOGY | Age: 25
End: 2020-07-10
Attending: INTERNAL MEDICINE

## 2020-07-10 VITALS
DIASTOLIC BLOOD PRESSURE: 126 MMHG | HEIGHT: 65 IN | HEART RATE: 103 BPM | SYSTOLIC BLOOD PRESSURE: 158 MMHG | BODY MASS INDEX: 19.28 KG/M2 | WEIGHT: 115.74 LBS

## 2020-07-10 DIAGNOSIS — G51.0 PALSY, BELL'S: Primary | ICD-10-CM

## 2020-07-10 DIAGNOSIS — R79.89 ELEVATED LFTS: Primary | ICD-10-CM

## 2020-07-10 DIAGNOSIS — G51.0 PALSY, BELL'S: ICD-10-CM

## 2020-07-10 LAB
ALBUMIN SERPL-MCNC: 4.2 G/DL (ref 3.6–5.1)
ALBUMIN/GLOB SERPL: 0.9 {RATIO} (ref 1–2.4)
ALP SERPL-CCNC: 76 UNITS/L (ref 45–117)
ALT SERPL-CCNC: 21 UNITS/L
ANION GAP SERPL CALC-SCNC: 11 MMOL/L (ref 10–20)
AST SERPL-CCNC: 14 UNITS/L
BASOPHILS # BLD: 0.1 K/MCL (ref 0–0.3)
BASOPHILS NFR BLD: 1 %
BILIRUB SERPL-MCNC: 0.4 MG/DL (ref 0.2–1)
BUN SERPL-MCNC: 8 MG/DL (ref 6–20)
BUN/CREAT SERPL: 11 (ref 7–25)
CALCIUM SERPL-MCNC: 10 MG/DL (ref 8.4–10.2)
CHLORIDE SERPL-SCNC: 105 MMOL/L (ref 98–107)
CO2 SERPL-SCNC: 27 MMOL/L (ref 21–32)
CREAT SERPL-MCNC: 0.74 MG/DL (ref 0.51–0.95)
CRP SERPL-MCNC: <0.3 MG/DL
DIFFERENTIAL METHOD BLD: NORMAL
EOSINOPHIL # BLD: 0.1 K/MCL (ref 0.1–0.5)
EOSINOPHIL NFR BLD: 1 %
ERYTHROCYTE [DISTWIDTH] IN BLOOD: 13.7 % (ref 11–15)
GLOBULIN SER-MCNC: 4.7 G/DL (ref 2–4)
GLUCOSE SERPL-MCNC: 91 MG/DL (ref 65–99)
HCT VFR BLD CALC: 39.3 % (ref 36–46.5)
HGB BLD-MCNC: 12.6 G/DL (ref 12–15.5)
IMM GRANULOCYTES # BLD AUTO: 0 K/MCL (ref 0–0.2)
IMM GRANULOCYTES NFR BLD: 1 %
LENGTH OF FAST TIME PATIENT: ABNORMAL HRS
LYMPHOCYTES # BLD: 1.4 K/MCL (ref 1–4.8)
LYMPHOCYTES NFR BLD: 28 %
MCH RBC QN AUTO: 30.3 PG (ref 26–34)
MCHC RBC AUTO-ENTMCNC: 32.1 G/DL (ref 32–36.5)
MCV RBC AUTO: 94.5 FL (ref 78–100)
MONOCYTES # BLD: 0.6 K/MCL (ref 0.3–0.9)
MONOCYTES NFR BLD: 11 %
NEUTROPHILS # BLD: 3 K/MCL (ref 1.8–7.7)
NEUTROPHILS NFR BLD: 58 %
NRBC BLD MANUAL-RTO: 0 /100 WBC
PLATELET # BLD: 406 K/MCL (ref 140–450)
POTASSIUM SERPL-SCNC: 4 MMOL/L (ref 3.4–5.1)
PROT SERPL-MCNC: 8.9 G/DL (ref 6.4–8.2)
PTH-INTACT SERPL-MCNC: 79 PG/ML (ref 19–88)
RBC # BLD: 4.16 MIL/MCL (ref 4–5.2)
SODIUM SERPL-SCNC: 139 MMOL/L (ref 135–145)
WBC # BLD: 5.1 K/MCL (ref 4.2–11)

## 2020-07-10 PROCEDURE — 85613 RUSSELL VIPER VENOM DILUTED: CPT | Performed by: PSYCHIATRY & NEUROLOGY

## 2020-07-10 PROCEDURE — 85670 THROMBIN TIME PLASMA: CPT | Performed by: PSYCHIATRY & NEUROLOGY

## 2020-07-10 PROCEDURE — 85732 THROMBOPLASTIN TIME PARTIAL: CPT | Performed by: PSYCHIATRY & NEUROLOGY

## 2020-07-10 PROCEDURE — 85390 FIBRINOLYSINS SCREEN I&R: CPT | Performed by: PSYCHIATRY & NEUROLOGY

## 2020-07-10 PROCEDURE — 80053 COMPREHEN METABOLIC PANEL: CPT | Performed by: PSYCHIATRY & NEUROLOGY

## 2020-07-10 PROCEDURE — 86255 FLUORESCENT ANTIBODY SCREEN: CPT | Performed by: PSYCHIATRY & NEUROLOGY

## 2020-07-10 PROCEDURE — 85025 COMPLETE CBC W/AUTO DIFF WBC: CPT | Performed by: PSYCHIATRY & NEUROLOGY

## 2020-07-10 PROCEDURE — 85652 RBC SED RATE AUTOMATED: CPT | Performed by: PSYCHIATRY & NEUROLOGY

## 2020-07-10 PROCEDURE — 86038 ANTINUCLEAR ANTIBODIES: CPT | Performed by: PSYCHIATRY & NEUROLOGY

## 2020-07-10 PROCEDURE — 86140 C-REACTIVE PROTEIN: CPT | Performed by: PSYCHIATRY & NEUROLOGY

## 2020-07-10 PROCEDURE — 86618 LYME DISEASE ANTIBODY: CPT | Performed by: PSYCHIATRY & NEUROLOGY

## 2020-07-10 PROCEDURE — 36415 COLL VENOUS BLD VENIPUNCTURE: CPT

## 2020-07-10 PROCEDURE — 99205 OFFICE O/P NEW HI 60 MIN: CPT | Performed by: PSYCHIATRY & NEUROLOGY

## 2020-07-10 RX ORDER — PREDNISONE 20 MG/1
TABLET ORAL
Qty: 20 TABLET | Refills: 0 | Status: SHIPPED | OUTPATIENT
Start: 2020-07-10

## 2020-07-10 SDOH — HEALTH STABILITY: MENTAL HEALTH: HOW OFTEN DO YOU HAVE A DRINK CONTAINING ALCOHOL?: NEVER

## 2020-07-11 LAB
APTT-LA INSENS PPP: 31.1 SEC (ref 22–32)
ERYTHROCYTE [SEDIMENTATION RATE] IN BLOOD BY WESTERGREN METHOD: 45 MM/HR (ref 0–20)
LA 3 SCREEN W REFLEX-IMP: ABNORMAL
MIXING APTT: 27.7 SEC (ref 22–32)
MIXING DRVVT: 34 SEC
PATHOLOGIST NAME: ABNORMAL
SCREEN DRVVT: 37.7 SEC
THROMBIN TIME: 22.7 SEC (ref 15.3–21.1)

## 2020-07-13 LAB
ANA SER QL IA: NEGATIVE
B BURGDOR IGG+IGM SER QL: NEGATIVE

## 2020-07-14 ENCOUNTER — TELEPHONE (OUTPATIENT)
Dept: NEUROLOGY | Age: 25
End: 2020-07-14

## 2020-07-15 LAB — PARANEOPLASTIC AB SER QL IF: NORMAL

## 2020-07-16 ENCOUNTER — TELEPHONE (OUTPATIENT)
Dept: INTERNAL MEDICINE | Age: 25
End: 2020-07-16

## 2020-07-21 ENCOUNTER — E-ADVICE (OUTPATIENT)
Dept: INTERNAL MEDICINE | Age: 25
End: 2020-07-21

## 2020-07-21 ENCOUNTER — TELEPHONE (OUTPATIENT)
Dept: SCHEDULING | Age: 25
End: 2020-07-21

## 2020-07-28 ENCOUNTER — HOSPITAL ENCOUNTER (OUTPATIENT)
Dept: MRI IMAGING | Age: 25
Discharge: HOME | End: 2020-07-28
Attending: PSYCHIATRY & NEUROLOGY

## 2020-07-28 PROCEDURE — A9585 GADOBUTROL INJECTION: HCPCS | Performed by: PSYCHIATRY & NEUROLOGY

## 2020-07-28 PROCEDURE — 10002805 HB CONTRAST AGENT: Performed by: PSYCHIATRY & NEUROLOGY

## 2020-07-28 PROCEDURE — 70553 MRI BRAIN STEM W/O & W/DYE: CPT

## 2020-07-28 RX ORDER — GADOBUTROL 604.72 MG/ML
5 INJECTION INTRAVENOUS ONCE
Status: COMPLETED | OUTPATIENT
Start: 2020-07-28 | End: 2020-07-28

## 2020-07-28 RX ADMIN — GADOBUTROL 5 ML: 604.72 INJECTION INTRAVENOUS at 22:00

## 2020-07-29 ENCOUNTER — V-VISIT (OUTPATIENT)
Dept: GASTROENTEROLOGY | Age: 25
End: 2020-07-29

## 2020-07-29 DIAGNOSIS — R79.89 ELEVATED LFTS: Primary | ICD-10-CM

## 2020-07-29 PROCEDURE — 99244 OFF/OP CNSLTJ NEW/EST MOD 40: CPT | Performed by: INTERNAL MEDICINE

## 2020-07-29 SDOH — HEALTH STABILITY: PHYSICAL HEALTH: ON AVERAGE, HOW MANY DAYS PER WEEK DO YOU ENGAGE IN MODERATE TO STRENUOUS EXERCISE (LIKE A BRISK WALK)?: 0 DAYS

## 2020-07-29 SDOH — HEALTH STABILITY: MENTAL HEALTH
STRESS IS WHEN SOMEONE FEELS TENSE, NERVOUS, ANXIOUS, OR CAN'T SLEEP AT NIGHT BECAUSE THEIR MIND IS TROUBLED. HOW STRESSED ARE YOU?: NOT AT ALL

## 2020-07-29 SDOH — HEALTH STABILITY: MENTAL HEALTH: HOW OFTEN DO YOU HAVE A DRINK CONTAINING ALCOHOL?: NEVER

## 2020-07-29 SDOH — HEALTH STABILITY: PHYSICAL HEALTH: ON AVERAGE, HOW MANY MINUTES DO YOU ENGAGE IN EXERCISE AT THIS LEVEL?: 0 MIN

## 2020-07-29 ASSESSMENT — ENCOUNTER SYMPTOMS
AGITATION: 0
CONSTITUTIONAL NEGATIVE: 1
EYE PAIN: 0
NERVOUS/ANXIOUS: 0
TROUBLE SWALLOWING: 0
RESPIRATORY NEGATIVE: 1
HEMATOLOGIC/LYMPHATIC NEGATIVE: 1
CONFUSION: 0
GASTROINTESTINAL NEGATIVE: 1
SLEEP DISTURBANCE: 0
ALLERGIC/IMMUNOLOGIC NEGATIVE: 1
EYE REDNESS: 0
SORE THROAT: 0
NEUROLOGICAL NEGATIVE: 1
ENDOCRINE NEGATIVE: 1

## 2020-07-29 ASSESSMENT — PATIENT HEALTH QUESTIONNAIRE - PHQ9
SUM OF ALL RESPONSES TO PHQ9 QUESTIONS 1 AND 2: 0
CLINICAL INTERPRETATION OF PHQ2 SCORE: NO FURTHER SCREENING NEEDED
SUM OF ALL RESPONSES TO PHQ9 QUESTIONS 1 AND 2: 0
2. FEELING DOWN, DEPRESSED OR HOPELESS: NOT AT ALL
1. LITTLE INTEREST OR PLEASURE IN DOING THINGS: NOT AT ALL
CLINICAL INTERPRETATION OF PHQ9 SCORE: NO FURTHER SCREENING NEEDED

## 2020-07-31 ENCOUNTER — TELEPHONE (OUTPATIENT)
Dept: NEUROLOGY | Age: 25
End: 2020-07-31

## 2020-07-31 DIAGNOSIS — G51.0 PALSY, BELL'S: Primary | ICD-10-CM

## 2020-08-03 ENCOUNTER — OFFICE VISIT (OUTPATIENT)
Dept: NEUROLOGY | Age: 25
End: 2020-08-03

## 2020-08-03 ENCOUNTER — IMAGING SERVICES (OUTPATIENT)
Dept: NEUROLOGY | Age: 25
End: 2020-08-03
Attending: PSYCHIATRY & NEUROLOGY

## 2020-08-03 VITALS
DIASTOLIC BLOOD PRESSURE: 104 MMHG | SYSTOLIC BLOOD PRESSURE: 144 MMHG | HEIGHT: 64 IN | HEART RATE: 99 BPM | BODY MASS INDEX: 20.47 KG/M2 | WEIGHT: 119.93 LBS

## 2020-08-03 DIAGNOSIS — G51.0 PALSY, BELL'S: ICD-10-CM

## 2020-08-03 DIAGNOSIS — G51.0 FACIAL NERVE PALSY: Primary | ICD-10-CM

## 2020-08-03 DIAGNOSIS — R29.810 FACIAL WEAKNESS: ICD-10-CM

## 2020-08-03 PROCEDURE — 95886 MUSC TEST DONE W/N TEST COMP: CPT | Performed by: PSYCHIATRY & NEUROLOGY

## 2020-08-03 PROCEDURE — 99215 OFFICE O/P EST HI 40 MIN: CPT | Performed by: PSYCHIATRY & NEUROLOGY

## 2020-08-03 PROCEDURE — 95912 NRV CNDJ TEST 11-12 STUDIES: CPT | Performed by: PSYCHIATRY & NEUROLOGY

## 2020-08-03 ASSESSMENT — ENCOUNTER SYMPTOMS
DIARRHEA: 0
ABDOMINAL PAIN: 0
FEVER: 0
TROUBLE SWALLOWING: 0
CHILLS: 0
WEAKNESS: 1
FATIGUE: 0
SHORTNESS OF BREATH: 0
NAUSEA: 0

## 2020-08-05 ENCOUNTER — APPOINTMENT (OUTPATIENT)
Dept: NEUROLOGY | Age: 25
End: 2020-08-05

## 2021-07-06 ENCOUNTER — TELEPHONE (OUTPATIENT)
Dept: INTERNAL MEDICINE | Age: 26
End: 2021-07-06

## (undated) NOTE — ED AVS SNAPSHOT
[facility] Emergency Department    [address] [address]    Phone:  [phone]    Fax:  [phone] [facility]   MRN: [id number]    Department:  [facility] Emergency Department   Date of Visit:  6/2 and Class Registration line at [phone] or find a doctor online by visiting [email or web address].    IF THERE IS ANY CHANGE OR WORSENING OF YOUR CONDITION, CALL YOUR PRIMARY CARE PHYSICIAN AT ONCE OR RETURN IMMEDIATELY TO [address] [place] [address].     If

## (undated) NOTE — MR AVS SNAPSHOT
[address]  [address]  [phone]               Thank you for choosing us for your health care visit with Nurse.   We are glad to serve you and happy to provide you with this summary of your vis Healthy Diet and Regular Exercise  [employer] [facility] The Box for making healthy food choices  -   Enjoy your food, but eat less. Fully enjoy your food when eating. Don’t eat while distracted and slow down. Avoid over sized portions.    Wade Wesley

## (undated) NOTE — IP AVS SNAPSHOT
[address]  [address] [facility] [phone]                Discharge Summary   5/30/2017    Michael Franks           Admission Information        Provider Department    5/30/2017 Yaniv Long DO [facility] · Swelling in your hands, feet or face  · Sudden weight gain  · Shortness of breath  · \"Just not feeling right\"        Future Appointments     Jun 06, 2017 11:00 AM   Return OB with Svetlana Xavier, [address] [facility], [address] Blood Type Rh Factor Rubella GBBS    (01/11/17)  O (01/11/17)  Positive (01/11/17)  Immune --    (12/16/16)  O (12/16/16)  Positive        Recent Hematology Lab Results  (Last 3 results in the past 90 days)    WBC RBC Hemoglobin Hematocrit MCV MCH MCHC RD Handwashing and Respiratory Hygiene Done               Additional Information       We are concerned for your overall well being:    - If you are a smoker or have smoked in the last 12 months, we encourage you to explore options for quitting.     - If you

## (undated) NOTE — MR AVS SNAPSHOT
[address]  [phone]               Thank you for choosing us for your health care visit with Nurse.   We are glad to serve you and happy to provide you with this summary of your vis

## (undated) NOTE — IP AVS SNAPSHOT
Patient Demographics     Address Phone E-mail Address    [address] [phone] [facility])  [phone] (Mobile) *Preferred* [email or web address]      Emergency Contact(s)     Name Relation Home Work [address] [place] [address] Medroxyprogesterone Acetate,1mg InJ 10/31/14     Medroxyprogesterone Acetate,1mg InJ 07/29/14     Medroxyprogesterone Acetate,1mg InJ 04/16/14     Medroxyprogesterone Acetate,1mg InJ 01/20/14       Future Appointments        Provider Helena Batista

## (undated) NOTE — MR AVS SNAPSHOT
[facility]  [address], [address]  [phone]               Thank you for choosing us for your health care visit with Neha Amador MD.  We are glad to serve you and happy to provide you with this summary

## (undated) NOTE — MR AVS SNAPSHOT
[address]  [phone]               Thank you for choosing us for your health care visit with Nurse.   We are glad to serve you and happy to provide you with this summary of your vis

## (undated) NOTE — IP AVS SNAPSHOT
[address]  602 [facility] ~ [phone]                Discharge Summary   6/6/2017    Eric Beck           Admission Information        Provider Department    6/6/2017 Hilda Gross MD [facility] Obstetrics Outpatient ([address])    [address]  [address] [id number] [phone]            Camilo 15, 2017 11:00 AM   Return OB with Gayla Mccain, 111 [facility], [address] [facility] Metabolic Lab Results  (Last result in the past 90 days)    ALT Bilirubin,Total Total Protein Albumin Sodium Potassium Chloride    (05/31/17)  10 (L) (05/31/17)  0.3 (05/31/17)  6.2 (05/31/17)  3.0 (L) (05/31/17)  138 (05/31/17)  4.1 (05/31/17)  108      R call your provider or healthcare team if you have any questions regarding your medications while at home.          All Other Medications     Prenatal Vit-Fe Fumarate-FA (GNP PRENATAL VITAMINS) 28-0.8 MG Oral Tab

## (undated) NOTE — ED AVS SNAPSHOT
[facility] Emergency Department    [address] [address]     [place] [address]    Phone:  [phone]    Fax:  [phone] [facility]   MRN: [id number]    Department:  [facility] Emergency Department   Date of Visit:  6/2 indicado, llame al encargado de misti al [phone]. It is our goal to assure that you are completely satisfied with every aspect of your visit today.   In an effort to constantly improve our service to you, we would appreciate any positive or negativ Any imaging studies and labs completed today can be reviewed in your MyChart account. You may have had testing done that requires us to contact you. Please make sure we have your correct phone number on file.       I certified that I have received a copy Call the [employer] for assistance with your inactive [employer] account    If you have questions, you can call [phone] to talk to our [facility] Staff. Remember, [employer] is NOT to be used for urgent needs. For medical emergencies, dial 911.     Vi

## (undated) NOTE — IP AVS SNAPSHOT
[address]  602 [facility] ~ [phone]                Discharge Summary   6/6/2017    Rigo Lord           Admission Information        Provider Department    6/6/2017 Mecca Saxena MD [facility] 3e C- Preeclampsia is a serious disease related to high blood pressure (hypertension). Preeclampsia/hypertension can happen during pregnancy and up to 6 weeks following childbirth.   Contact your doctor or midwife immediately if you experience any of the follow OB Lab Results  (Last 3 results in the past 270 days)    Blood Type Rh Factor Rubella GBBS    (06/06/17)  O (06/06/17)  Positive (01/11/17)  Immune --    (01/11/17)  O (01/11/17)  Positive      (12/16/16)  O (12/16/16)  Positive        Recent Hematology [address] 137 (06/06/17)  4.0 (06/06/17)  104      Pending Labs     Order Current Status    SURGICAL PATHOLOGY TISSUE In process      Radiology Exams     None      Patient Belongings       Most Recent Value    All belongings returned to patient at discharge     Medi Signs and symptoms / prevention / management of engorgement if applicable Active   Signs and symptoms / prevention / management of plugged ducts / mastitis if applicable Active   Signs and symptoms / prevention / management of thrush if applicable Active Psychosocial/Spiritual Support Resolved   Community Resources Resolved   Preeclampsia/Hypertension Resolved         Labor and Delivery Education  Resolved   Insulin Resolved   Ultrasound Resolved   Amnioinfusion Resolved   Induction / Augmentation Agents R